# Patient Record
Sex: FEMALE | Race: WHITE | URBAN - METROPOLITAN AREA
[De-identification: names, ages, dates, MRNs, and addresses within clinical notes are randomized per-mention and may not be internally consistent; named-entity substitution may affect disease eponyms.]

---

## 2017-08-09 ENCOUNTER — EMERGENCY (EMERGENCY)
Facility: HOSPITAL | Age: 32
LOS: 1 days | Discharge: HOME | End: 2017-08-09
Attending: EMERGENCY MEDICINE
Payer: MEDICARE

## 2017-08-09 VITALS
OXYGEN SATURATION: 98 % | HEART RATE: 82 BPM | RESPIRATION RATE: 18 BRPM | TEMPERATURE: 98 F | SYSTOLIC BLOOD PRESSURE: 128 MMHG | DIASTOLIC BLOOD PRESSURE: 82 MMHG

## 2017-08-09 DIAGNOSIS — Z96.643 PRESENCE OF ARTIFICIAL HIP JOINT, BILATERAL: Chronic | ICD-10-CM

## 2017-08-09 PROCEDURE — 73562 X-RAY EXAM OF KNEE 3: CPT

## 2017-08-09 PROCEDURE — 73562 X-RAY EXAM OF KNEE 3: CPT | Mod: 26,LT

## 2017-08-09 PROCEDURE — 99284 EMERGENCY DEPT VISIT MOD MDM: CPT | Mod: 25

## 2017-08-09 PROCEDURE — 73502 X-RAY EXAM HIP UNI 2-3 VIEWS: CPT

## 2017-08-09 PROCEDURE — 73502 X-RAY EXAM HIP UNI 2-3 VIEWS: CPT | Mod: 26,LT

## 2017-08-09 PROCEDURE — 99283 EMERGENCY DEPT VISIT LOW MDM: CPT

## 2017-08-09 RX ORDER — IBUPROFEN 200 MG
600 TABLET ORAL ONCE
Qty: 0 | Refills: 0 | Status: DISCONTINUED | OUTPATIENT
Start: 2017-08-09 | End: 2017-08-13

## 2017-08-09 NOTE — ED PROVIDER NOTE - MEDICAL DECISION MAKING DETAILS
PT IS A 30YO FEMALE WITH LEFT KNEE AND HIP PAIN. WILL X RAY KNEE AND HIP. WILL GIVE IBUPROFEN FOR PAIN AND RE-EVALUATE.

## 2017-08-09 NOTE — ED PROVIDER NOTE - ATTENDING CONTRIBUTION TO CARE
31y old with fall, previous hip replacement, able to get up, no head injury, no loc, was able to walk, no distal weakness, I personally saw the patient with the PA, and completed the key components of the history and physical exam. I then discussed the management plan with the PA.

## 2017-08-09 NOTE — ED ADULT NURSE NOTE - OBJECTIVE STATEMENT
pt arrived s/p fall at store, pt with left hip pain, pt with history of b/l hip replacement, pt denies hitting head, denies loc

## 2017-08-09 NOTE — ED PROVIDER NOTE - PHYSICAL EXAMINATION
MS HIP LEFT:+ TTP LEFT HIP. NO NORMA DEFORMITY. NO ECCHYMOSIS OR SWELLING. UNABLE TO ROM DUE TO PAIN. SENSATION GROSSLY INTACT.   MS KNEE LEFT: + TTP LOWER MEDIAL AND LOWER LATERAL ASPECT OF KNEE. NO ECCHYMOSIS, EFFUSION, EDEMA. FROM OF KNEE. SENSATION GROSSLY INTACT. FROM OF LEFT FOOT.   + 2 DP LEFT LE.

## 2017-08-09 NOTE — ED PROVIDER NOTE - DIAGNOSTIC INTERPRETATION
LEFT KNEE AND HIP X RAY: NO FRACTURE. HARDWARE NOTED IN PLACE FOR HIP. REVIEWED WITH RADIOLOGIST RAFAEL GIRON.

## 2017-08-09 NOTE — ED PROVIDER NOTE - PROGRESS NOTE DETAILS
Xrays reviewed. pt counseled on results. pt given crutches and advised to take advil which she has at home. advised pt to follow up with ortho. pt advised she will follow up with her surgeon. pt verbalized understanding and agreement with plan and dx will dc

## 2017-08-09 NOTE — ED PROVIDER NOTE - OBJECTIVE STATEMENT
pt is a 32yo female with pmhx of genetic d/o, glaucoma on unknown drops and hip replacement BL 2 years ago c/o L hip and knee pain s/p fall. pt reports she slipped and fell on her left knee and hip at [facility] today. pt reports she did not hit her head.pt reports 9/10 non-radiating pain. pt did not do anything for the pain. pt is concerned because she had a hip replacement. NKDA

## 2017-08-11 NOTE — ED POST DISCHARGE NOTE - OTHER COMMUNICATION
spoke with patient regarding FX, advised to f/u with ortho.  Patient understands and agrees to proceed.

## 2018-05-30 ENCOUNTER — TRANSCRIPTION ENCOUNTER (OUTPATIENT)
Age: 33
End: 2018-05-30

## 2018-07-01 ENCOUNTER — OUTPATIENT (OUTPATIENT)
Dept: OUTPATIENT SERVICES | Facility: HOSPITAL | Age: 33
LOS: 1 days | End: 2018-07-01
Payer: MEDICARE

## 2018-07-01 DIAGNOSIS — Z96.643 PRESENCE OF ARTIFICIAL HIP JOINT, BILATERAL: Chronic | ICD-10-CM

## 2018-07-24 ENCOUNTER — OUTPATIENT (OUTPATIENT)
Dept: OUTPATIENT SERVICES | Facility: HOSPITAL | Age: 33
LOS: 1 days | End: 2018-07-24

## 2018-07-24 DIAGNOSIS — Z71.89 OTHER SPECIFIED COUNSELING: ICD-10-CM

## 2019-08-02 NOTE — ED PROVIDER NOTE - INCIDENT LOCATION
Problem: Fall Injury Risk  Goal: Absence of Fall and Fall-Related Injury    Intervention: Identify and Manage Contributors to Fall Injury Risk     08/02/19 0553   Manage Acute Allergic Reaction   Medication Review/Management medications reviewed;high risk medications identified;provider consulted   Identify and Manage Contributors to Fall Injury Risk   Self-Care Promotion independence encouraged     Intervention: Promote Injury-Free Environment     08/02/19 0553   Optimize [place] and Functional Mobility   Environmental Safety Modification assistive device/personal items within reach;clutter free environment maintained;lighting adjusted   Optimize Balance and Safe Activity   Safety Promotion/Fall Prevention assistive device/personal item within reach;Fall Risk reviewed with patient/family;lighting adjusted;medications reviewed;side rails raised x 2            [employer]

## 2021-01-22 ENCOUNTER — TRANSCRIPTION ENCOUNTER (OUTPATIENT)
Age: 36
End: 2021-01-22

## 2022-03-21 NOTE — ED ADULT NURSE NOTE - GASTROINTESTINAL WDL
Problem: Impaired Wound Healing  Goal: Optimal Wound Healing  Outcome: Ongoing, Progressing  Intervention: Promote Wound Healing  Recent Flowsheet Documentation  Taken 3/21/2022 0411 by Mabel Nolan, RN  Activity Management: activity adjusted per tolerance  Taken 3/21/2022 0016 by Mabel Nolan, RN  Activity Management: activity adjusted per tolerance     Problem: Infection  Goal: Absence of Infection Signs and Symptoms  Outcome: Ongoing, Progressing   Goal Outcome Evaluation:      Pt refuses surgical skin prep/bath over noc, as well as, wound assessment. Plan for surgery this am. IV Abx per md order.  Frequent blood glucose checks while NPO.                      Abdomen soft, nontender, nondistended, bowel sounds present in all 4 quadrants.

## 2024-03-22 PROBLEM — H40.9 UNSPECIFIED GLAUCOMA: Chronic | Status: ACTIVE | Noted: 2017-08-09

## 2024-03-22 PROBLEM — Q99.9 CHROMOSOMAL ABNORMALITY, UNSPECIFIED: Chronic | Status: ACTIVE | Noted: 2017-08-09
